# Patient Record
Sex: MALE | Race: WHITE | NOT HISPANIC OR LATINO | URBAN - METROPOLITAN AREA
[De-identification: names, ages, dates, MRNs, and addresses within clinical notes are randomized per-mention and may not be internally consistent; named-entity substitution may affect disease eponyms.]

---

## 2019-08-02 ENCOUNTER — OUTPATIENT (OUTPATIENT)
Dept: OUTPATIENT SERVICES | Facility: HOSPITAL | Age: 59
LOS: 1 days | End: 2019-08-02
Payer: COMMERCIAL

## 2019-08-02 VITALS
OXYGEN SATURATION: 96 % | SYSTOLIC BLOOD PRESSURE: 122 MMHG | HEART RATE: 76 BPM | TEMPERATURE: 97 F | RESPIRATION RATE: 14 BRPM | WEIGHT: 126.99 LBS | DIASTOLIC BLOOD PRESSURE: 70 MMHG | HEIGHT: 65.5 IN

## 2019-08-02 DIAGNOSIS — N18.6 END STAGE RENAL DISEASE: ICD-10-CM

## 2019-08-02 DIAGNOSIS — I77.0 ARTERIOVENOUS FISTULA, ACQUIRED: Chronic | ICD-10-CM

## 2019-08-02 DIAGNOSIS — K31.1 ADULT HYPERTROPHIC PYLORIC STENOSIS: Chronic | ICD-10-CM

## 2019-08-02 DIAGNOSIS — Z90.89 ACQUIRED ABSENCE OF OTHER ORGANS: Chronic | ICD-10-CM

## 2019-08-02 LAB
ANION GAP SERPL CALC-SCNC: 14 MMO/L — SIGNIFICANT CHANGE UP (ref 7–14)
BUN SERPL-MCNC: 42 MG/DL — HIGH (ref 7–23)
CALCIUM SERPL-MCNC: 9.8 MG/DL — SIGNIFICANT CHANGE UP (ref 8.4–10.5)
CHLORIDE SERPL-SCNC: 97 MMOL/L — LOW (ref 98–107)
CO2 SERPL-SCNC: 32 MMOL/L — HIGH (ref 22–31)
CREAT SERPL-MCNC: 3.2 MG/DL — HIGH (ref 0.5–1.3)
GLUCOSE SERPL-MCNC: 149 MG/DL — HIGH (ref 70–99)
HCT VFR BLD CALC: 33.1 % — LOW (ref 39–50)
HGB BLD-MCNC: 10 G/DL — LOW (ref 13–17)
MCHC RBC-ENTMCNC: 30.2 % — LOW (ref 32–36)
MCHC RBC-ENTMCNC: 32.3 PG — SIGNIFICANT CHANGE UP (ref 27–34)
MCV RBC AUTO: 106.8 FL — HIGH (ref 80–100)
NRBC # FLD: 0 K/UL — SIGNIFICANT CHANGE UP (ref 0–0)
PLATELET # BLD AUTO: 218 K/UL — SIGNIFICANT CHANGE UP (ref 150–400)
PMV BLD: 11.4 FL — SIGNIFICANT CHANGE UP (ref 7–13)
POTASSIUM SERPL-MCNC: 3.6 MMOL/L — SIGNIFICANT CHANGE UP (ref 3.5–5.3)
POTASSIUM SERPL-SCNC: 3.6 MMOL/L — SIGNIFICANT CHANGE UP (ref 3.5–5.3)
RBC # BLD: 3.1 M/UL — LOW (ref 4.2–5.8)
RBC # FLD: 17.1 % — HIGH (ref 10.3–14.5)
SODIUM SERPL-SCNC: 143 MMOL/L — SIGNIFICANT CHANGE UP (ref 135–145)
WBC # BLD: 8.38 K/UL — SIGNIFICANT CHANGE UP (ref 3.8–10.5)
WBC # FLD AUTO: 8.38 K/UL — SIGNIFICANT CHANGE UP (ref 3.8–10.5)

## 2019-08-02 PROCEDURE — 93010 ELECTROCARDIOGRAM REPORT: CPT

## 2019-08-02 RX ORDER — FAMOTIDINE 10 MG/ML
1 INJECTION INTRAVENOUS
Qty: 0 | Refills: 0 | DISCHARGE

## 2019-08-02 RX ORDER — IPRATROPIUM/ALBUTEROL SULFATE 18-103MCG
3 AEROSOL WITH ADAPTER (GRAM) INHALATION
Qty: 0 | Refills: 0 | DISCHARGE

## 2019-08-02 RX ORDER — LEVOTHYROXINE SODIUM 125 MCG
1 TABLET ORAL
Qty: 0 | Refills: 0 | DISCHARGE

## 2019-08-02 RX ORDER — ATORVASTATIN CALCIUM 80 MG/1
1 TABLET, FILM COATED ORAL
Qty: 0 | Refills: 0 | DISCHARGE

## 2019-08-02 RX ORDER — CALCIUM ACETATE 667 MG
2 TABLET ORAL
Qty: 0 | Refills: 0 | DISCHARGE

## 2019-08-02 RX ORDER — METOPROLOL TARTRATE 50 MG
1 TABLET ORAL
Qty: 0 | Refills: 0 | DISCHARGE

## 2019-08-02 RX ORDER — FOLIC ACID 0.8 MG
1 TABLET ORAL
Qty: 0 | Refills: 0 | DISCHARGE

## 2019-08-02 RX ORDER — ACETAMINOPHEN 500 MG
1 TABLET ORAL
Qty: 0 | Refills: 0 | DISCHARGE

## 2019-08-02 NOTE — H&P PST ADULT - ACTIVITY
in [facility] s/p fall now using wheelchair, while using walker has sob in [facility] s/p fall now using wheelchair, while using walker has sob since 2/2019

## 2019-08-02 NOTE — H&P PST ADULT - NSICDXPROBLEM_GEN_ALL_CORE_FT
PROBLEM DIAGNOSES  Problem: End stage renal disease  Assessment and Plan:   Pt scheduled for transposition of basilic vein right arm AV Fistula on 8/7/2019.  labs done results pending, ekg done, PT reports had recent CXR, Scheduled for extra HD today.  echo report in chart.  Will request medical and and cardiology eval.  Preop teaching done, pt able to verbalize understanding. PROBLEM DIAGNOSES  Problem: End stage renal disease  Assessment and Plan:   Pt scheduled for transposition of basilic vein right arm AV Fistula on 8/7/2019.  labs done results pending, ekg done, PT reports had recent CXR, Scheduled for extra HD today.  echo report in chart.  Will request medical and and cardiology eval.  Preop teaching done, pt able to verbalize understanding.    Meds day of surgery-  levothyroxine, famotidine, metoprolol, duoneb PROBLEM DIAGNOSES  Problem: End stage renal disease  Assessment and Plan:   Pt scheduled for transposition of basilic vein right arm AV Fistula on 8/7/2019.  labs done results pending, ekg done, PT reports had recent CXR, Scheduled for extra HD today.  echo report in chart.  Will request medical and and cardiology eval.  Preop teaching done, pt able to verbalize understanding.    Meds day of surgery-  levothyroxine, famotidine, metoprolol, duoneb    Spoke with RICHARD Macario from nursing [facility], stated is currently being evaluated by cardiology, having a chest ct at this moment in [facility],  will obtain medical and cardiology eval,  Discussed with Arlene in Dr. Finley's office.

## 2019-08-02 NOTE — H&P PST ADULT - NEGATIVE GENERAL SYMPTOMS
no fever/no chills/no weight loss/no polyuria/no fatigue/no sweating/no anorexia/no weight gain/no polyphagia/no polydipsia/no malaise

## 2019-08-02 NOTE — H&P PST ADULT - CARDIOVASCULAR COMMENTS
fluid in chest cavity since being n [facility], AV fistula placed in right arm while in [facility] AV fistula placed in right arm while in [facility] right chest HD cath

## 2019-08-02 NOTE — H&P PST ADULT - HISTORY OF PRESENT ILLNESS
59y/o male scheduled for transposition of basilic vein right arm arteriovenous fistula on 8/7/2019.  Pt states, "hx chronic renal insuff  was admitted to [facility] in 2/2019 s/p fall at home,  was d/c from hospital, now lives in [facility].   While in NH was started on  HD s tues, thurs, and sat in nursing home via right HD cath.  Reports since being  in [facility] has fluid in pleural space, and edema in bilateral lower extremities."  PT is a poor historian . 59y/o male scheduled for transposition of basilic vein right arm arteriovenous fistula on 8/7/2019.  Pt states, "hx chronic renal insuff  was admitted to [facility] in 2/2019 s/p fall at home,  was d/c from hospital, now lives in [facility].   While in NH was started on  HD s tues, thurs, and sat in nursing home via right HD cath.  Reports since being  in [facility] has fluid in pleural space, and edema in bilateral lower extremities.  Had a recent cxr, scheduled for an additional HD today to help remove more fluid."

## 2019-08-02 NOTE — H&P PST ADULT - NEGATIVE NEUROLOGICAL SYMPTOMS
no transient paralysis/no paresthesias/no difficulty walking/no facial palsy/no loss of consciousness/no focal seizures/no loss of sensation/no hemiparesis/no vertigo/no headache/no confusion/no syncope/no weakness/no generalized seizures/no tremors

## 2019-08-02 NOTE — H&P PST ADULT - GASTROINTESTINAL DETAILS
soft/no rigidity/no bruit/no rebound tenderness/no organomegaly/bowel sounds normal/nontender/no guarding/no masses palpable

## 2019-08-02 NOTE — H&P PST ADULT - NEGATIVE BREAST SYMPTOMS
no breast tenderness L/no nipple discharge R/no breast tenderness R/no breast lump L/no breast lump R/no nipple discharge L

## 2019-08-02 NOTE — H&P PST ADULT - RESPIRATORY COMMENTS
decreased breath sounds bilateral bases decreased breath sounds bilateral bases, no sob at rest or with moving from chair to scale to bed exhibited

## 2019-08-02 NOTE — H&P PST ADULT - NSICDXPASTMEDICALHX_GEN_ALL_CORE_FT
PAST MEDICAL HISTORY:  End stage renal disease on dialysis     GERD (gastroesophageal reflux disease)     Hemodialysis patient     Hyperlipidemia     Hypertension     Hypothyroidism     Macular degeneration     Rhabdomyolysis

## 2019-08-02 NOTE — H&P PST ADULT - NSICDXPASTSURGICALHX_GEN_ALL_CORE_FT
PAST SURGICAL HISTORY:  AV fistula right arm 4/2019    Pyloric stenosis 1960    S/P tonsillectomy 1966

## 2019-08-02 NOTE — H&P PST ADULT - TEACHING/LEARNING EDUCATIONAL LEVEL
Pt here for PAT visit.  Pre-op tests completed, chg soap given, and instructions reviewed.  Instructed clears until 2 hrs prior to arrival time, voiced understanding.  
graduate school

## 2019-08-02 NOTE — H&P PST ADULT - NSANTHOSAYNRD_GEN_A_CORE
No. SUKHDEEP screening performed.  STOP BANG Legend: 0-2 = LOW Risk; 3-4 = INTERMEDIATE Risk; 5-8 = HIGH Risk

## 2021-03-25 NOTE — H&P PST ADULT - TRANSFUSION PREMEDICATION REQUIRED
Good afternoon,               Based on your recent lab results your WBC was slightly low.  It is something that I will continue to monitor, no intervention required at this time.  Your vitamin D was 19.6 which is low.  Please start loading dose of vitamin D 50,000 units once a week, the same day every week, with your largest meal for the next 8 weeks.  After loading dose completed, please take 1 to 2000 units of vitamin D daily over-the-counter with your biggest meal.  All other labs within normal limits.  The blisters on your lip are most likely due to the picking discussed.  Please continue to eat a healthy diet, exercise and drink plenty of water. none

## 2022-12-19 NOTE — H&P PST ADULT - BP NONINVASIVE SYSTOLIC (MM HG)
Left detailed message with verbal approval for orders requests.     Reminded requestor so fax orders for PCP signature.     Alicia Johnson RN     [phone]
